# Patient Record
Sex: FEMALE | Race: AMERICAN INDIAN OR ALASKA NATIVE
[De-identification: names, ages, dates, MRNs, and addresses within clinical notes are randomized per-mention and may not be internally consistent; named-entity substitution may affect disease eponyms.]

---

## 2019-01-09 ENCOUNTER — HOSPITAL ENCOUNTER (EMERGENCY)
Dept: HOSPITAL 5 - ED | Age: 29
End: 2019-01-09
Payer: MEDICAID

## 2019-01-09 VITALS — DIASTOLIC BLOOD PRESSURE: 79 MMHG | SYSTOLIC BLOOD PRESSURE: 120 MMHG

## 2019-01-09 DIAGNOSIS — R07.89: Primary | ICD-10-CM

## 2019-01-09 DIAGNOSIS — Z53.21: ICD-10-CM

## 2019-01-09 LAB
BASOPHILS # (AUTO): 0 K/MM3 (ref 0–0.1)
BASOPHILS NFR BLD AUTO: 0.4 % (ref 0–1.8)
BUN SERPL-MCNC: 6 MG/DL (ref 7–17)
BUN/CREAT SERPL: 10 %
CALCIUM SERPL-MCNC: 9.4 MG/DL (ref 8.4–10.2)
EOSINOPHIL # BLD AUTO: 0 K/MM3 (ref 0–0.4)
EOSINOPHIL NFR BLD AUTO: 0.3 % (ref 0–4.3)
HCT VFR BLD CALC: 43.9 % (ref 30.3–42.9)
HEMOLYSIS INDEX: 9
HGB BLD-MCNC: 14.5 GM/DL (ref 10.1–14.3)
LYMPHOCYTES # BLD AUTO: 2.1 K/MM3 (ref 1.2–5.4)
LYMPHOCYTES NFR BLD AUTO: 24.1 % (ref 13.4–35)
MCHC RBC AUTO-ENTMCNC: 33 % (ref 30–34)
MCV RBC AUTO: 91 FL (ref 79–97)
MONOCYTES # (AUTO): 0.6 K/MM3 (ref 0–0.8)
MONOCYTES % (AUTO): 6.4 % (ref 0–7.3)
PLATELET # BLD: 360 K/MM3 (ref 140–440)
RBC # BLD AUTO: 4.82 M/MM3 (ref 3.65–5.03)

## 2019-01-09 PROCEDURE — 36415 COLL VENOUS BLD VENIPUNCTURE: CPT

## 2019-01-09 PROCEDURE — 93005 ELECTROCARDIOGRAM TRACING: CPT

## 2019-01-09 PROCEDURE — 93010 ELECTROCARDIOGRAM REPORT: CPT

## 2019-01-09 PROCEDURE — 84484 ASSAY OF TROPONIN QUANT: CPT

## 2019-01-09 PROCEDURE — 80048 BASIC METABOLIC PNL TOTAL CA: CPT

## 2019-01-09 PROCEDURE — 85025 COMPLETE CBC W/AUTO DIFF WBC: CPT

## 2020-04-23 NOTE — HISTORY AND PHYSICAL REPORT
History of Present Illness


Date of examination: 20


Chief complaint: 





Desires repeat c/s with sterilization


History of present illness: 


Past Pregnancy History 


   :      2


   Term Births:   1


   Premature Births:   0


   Living Children:   1


   Para:      1


   Mult. Births:   0


   Prev :   1


   Aborta:      0


   Elect. Ab:      0


   Spont. Ab:      0


   Ectopics:      0





Pregnancy # 1


   Delivery date:     2014


   Weeks Gestation:   40 4/7


   Delivery type:     


   Anesthesia type:     epidural


   Delivery location:     [facility]


   Infant Sex:      female


   Birth weight:   7.81


   Comments:      uterine atony








Past Medical History:


   Reviewed history from 2011 and no changes required:


      Anemia


GDM diet controlled





Past Surgical History:


   Reviewed history from 2016 and no changes required:


      





Past Medical History 


Abnormal PAP: negative





Social Hx: Patient is single








Infection History 


Hx of STD: trich, CT


Hepatitis B Risk Eval: low risk


Personal hx. of genital herpes: no


Partner hx. of genital herpes: no


Rash, Viral, or Febrile illness since last LMP? no





Genetic History 


 Congenital Heart Defect:


    Mom: no  Dad: no


Canavan Disease:


    Mom: no  Dad: no


Thalassemia


    Mom: no  Dad: no


Neural Tube Defect


    Mom: no  Dad: no


Down's Syndrome


    Mom: no  Dad: no


Jaziel-Sachs


    Mom: no  Dad: no


Sickle Cell Disease/Trait


    Mom: no  Dad: no


Hemophilia


    Mom: no  Dad: no


Muscular Dystrophy


    Mom: no  Dad: no


Cystic Fibrosis


    Mom: no  Dad: no


Gabbs Chorea


    Mom: no  Dad: no


Mental Retardation


    Mom: no  Dad: no


Fragile X


    Mom: no  Dad: no


Other Genetic/Chromosomal Disorder


    Mom: no  Dad: no


Child w/other birth defect


    Mom: no  Dad: no





Enviromental Exposures 


Xray Exposure: no


Medication, drug, or alcohol use since LMP: no


Chemical/Other Exposure: no


Exposure to Cat Liter: no


Hx of Parvovirus (Fifth Disease): no


Occupational Exposure to Children: none


Active Medications (reviewed today):


VITAMINC C TABS () 


DAILY COMBO MULTI VITAMINS ORAL TABLET (MULTIPLE VITAMINS-MINERALS) 





Current Allergies (reviewed today):


No known allergies








Problem # 1:  Maternal care for low transverse scar from previous  

delivery (ICD-654.23) (XOY89-O22.211)





Her updated medication list for this problem includes:


   Prenatal Plus 27-1 Mg Oral Tablet (Prenatal vit-fe fumarate-fa) ..... 1 po 

daily


   Daily Combo Multi Vitamins Oral Tablet (Multiple vitamins-minerals)





Consent reviewed and signed .  The risks and alternatives for this surgery were 

reviewed with the patient. She was informed of possible bleeding, infection, 

injury to bowel, bladder, ureters or other adjacent organs. The patient was 

instructed/informed the following:


   The normal length of hospital stay for this procedure.


   Nothing to eat or drink after midnight the evening prior to surgery. 


 The usual discomforts associated with this procedure were detailed.  Patient 

was given ample opportunity to have all her questions answered before signing 

informed consent. 





Problem # 2:  Sterilization (ICD-V25.2) (XRO33-U65.2) Risks of regret 

emphasized. Permanent and irreversible condition explained to patient. Pt 

verbalized understanding.  Consent reviewed and signed.   The risks and 

alternatives to this surgery were reviewed with the patient. Patient given ample

opportunity to have all her questions answered before signing informed consent. 

Patient informed of possible bleeding. 1%failure rate emphasized 











Problem # 3:  Gestational Diabetes (ICD-648.83) (RLG81-M44.419)











Past History





- Obstetrical History


Expected Date of Delivery: 20


Actual Gestation: 38 Week(s) 6 Day(s) 


: 1





Medications and Allergies


                                    Allergies











Allergy/AdvReac Type Severity Reaction Status Date / Time


 


No Known Allergies Allergy   Verified 14 16:58











                                Home Medications











 Medication  Instructions  Recorded  Confirmed  Last Taken  Type


 


Ciprofloxacin HCl [Cipro] 500 mg PO Q12H #14 tab 10/03/13 11/22/14 Unknown Rx


 


metroNIDAZOLE [Flagyl] 500 mg PO BID #20 tablet 10/03/13 11/22/14 05/27/14 10:00

 Rx





    500 mg 


 


Docusate Sodium [Colace] 100 mg PO BID PRN #60 capsule 14  Unknown Rx


 


Ibuprofen [Motrin] 800 mg PO Q8H PRN #30 tablet 14  Unknown Rx


 


oxyCODONE /ACETAMINOPHEN [Percocet 1 - 2 tab PO Q6HR PRN #30 tablet 14  

Unknown Rx





5/325]     











Active Meds: 


Active Medications





Carboprost Tromethamine (Hemabate)  250 mcg IM ONCE PRN


   PRN Reason: bleeding


Citric Acid/Sodium Citrate (Bicitra)  30 ml PO ONCE ONE


   Stop: 20 07:01


Famotidine (Pepcid)  20 mg IV ONCE ONE


   Stop: 20 07:01


Oxytocin/Sodium Chloride (Pitocin/Ns 20 Unit/1000ml Drip)  20 units in 1,000 mls

@ 0 mls/hr IV TITR ROBY


Lactated Ringer's (Lactated Ringers)  1,000 mls @ 2,250 mls/hr IV PREOP ROBY


   Stop: 20 05:57


Cefazolin Sodium (Ancef/Sterile Water 2 Gm/20 Ml)  2 gm in 20 mls @ 80 mls/hr IV

PREOP NR; Protocol


Lidocaine/Prilocaine (Emla)  1 applic TP ONCE PRN


   PRN Reason: for gonzáles catheter insertion


Methylergonovine Maleate (Methergine)  0.2 mg IM ONCE PRN


   PRN Reason: bleeding


Metoclopramide HCl (Reglan)  10 mg IV ONCE ONE


   Stop: 20 07:01


Misoprostol (Cytotec)  1,000 mcg TX ONCE PRN


   PRN Reason: Bleeding











Results


All other labs normal.








Assessment and Plan





- Patient Problems


(1) 39 weeks gestation of pregnancy


Status: Acute   





(2) Maternal care due to low transverse uterine scar from previous  

delivery


Status: Acute   





(3) Sterilization


Status: Acute   





(4) Gestational diabetes


Status: Acute

## 2020-04-24 ENCOUNTER — HOSPITAL ENCOUNTER (INPATIENT)
Dept: HOSPITAL 5 - APU | Age: 30
LOS: 2 days | Discharge: HOME | End: 2020-04-26
Attending: OBSTETRICS & GYNECOLOGY | Admitting: OBSTETRICS & GYNECOLOGY
Payer: MEDICAID

## 2020-04-24 DIAGNOSIS — Z3A.39: ICD-10-CM

## 2020-04-24 DIAGNOSIS — Z79.899: ICD-10-CM

## 2020-04-24 DIAGNOSIS — O34.211: Primary | ICD-10-CM

## 2020-04-24 DIAGNOSIS — Z30.2: ICD-10-CM

## 2020-04-24 DIAGNOSIS — Z23: ICD-10-CM

## 2020-04-24 LAB
HCT VFR BLD CALC: 32.7 % (ref 30.3–42.9)
HCT VFR BLD CALC: 37.3 % (ref 30.3–42.9)
HGB BLD-MCNC: 11.1 GM/DL (ref 10.1–14.3)
HGB BLD-MCNC: 12.1 GM/DL (ref 10.1–14.3)
MCHC RBC AUTO-ENTMCNC: 32 % (ref 30–34)
MCV RBC AUTO: 79 FL (ref 79–97)
PLATELET # BLD: 267 K/MM3 (ref 140–440)
RBC # BLD AUTO: 4.74 M/MM3 (ref 3.65–5.03)

## 2020-04-24 PROCEDURE — 86900 BLOOD TYPING SEROLOGIC ABO: CPT

## 2020-04-24 PROCEDURE — 36415 COLL VENOUS BLD VENIPUNCTURE: CPT

## 2020-04-24 PROCEDURE — 86901 BLOOD TYPING SEROLOGIC RH(D): CPT

## 2020-04-24 PROCEDURE — 86850 RBC ANTIBODY SCREEN: CPT

## 2020-04-24 PROCEDURE — 85018 HEMOGLOBIN: CPT

## 2020-04-24 PROCEDURE — 0UB70ZZ EXCISION OF BILATERAL FALLOPIAN TUBES, OPEN APPROACH: ICD-10-PCS | Performed by: OBSTETRICS & GYNECOLOGY

## 2020-04-24 PROCEDURE — 85027 COMPLETE CBC AUTOMATED: CPT

## 2020-04-24 PROCEDURE — 82962 GLUCOSE BLOOD TEST: CPT

## 2020-04-24 PROCEDURE — 85014 HEMATOCRIT: CPT

## 2020-04-24 PROCEDURE — 88302 TISSUE EXAM BY PATHOLOGIST: CPT

## 2020-04-24 RX ADMIN — ACETAMINOPHEN SCH MG: 325 TABLET ORAL at 12:00

## 2020-04-24 RX ADMIN — ACETAMINOPHEN SCH MG: 325 TABLET ORAL at 17:34

## 2020-04-24 RX ADMIN — KETOROLAC TROMETHAMINE SCH MG: 30 INJECTION, SOLUTION INTRAMUSCULAR at 14:30

## 2020-04-24 RX ADMIN — KETOROLAC TROMETHAMINE SCH MG: 30 INJECTION, SOLUTION INTRAMUSCULAR at 20:15

## 2020-04-24 NOTE — ANESTHESIA CONSULTATION
Anesthesia Consult and Med Hx


Date of service: 04/24/20





- Airway


Anesthetic Teeth Evaluation: Good


ROM Head & Neck: Adequate


Mental/Hyoid Distance: Adequate





- Pulmonary Exam


CTA: Yes





- Cardiac Exam


Cardiac Exam: RRR





- Pre-Operative Health Status


ASA Pre-Surgery Classification: ASA2, Emergency


Proposed Anesthetic Plan: Spinal





- Pulmonary


Hx Asthma: No


COPD: No


Hx Pneumonia: No





- Cardiovascular System


Hx Hypertension: No





- Central Nervous System


Hx Seizures: No


Hx Psychiatric Problems: No





- Endocrine


Hx Renal Disease: No


Hx End Stage Renal Disease: No


Hx Non-Insulin Dependent Diabetes: Yes (Gestational Diabetic)


Hx Hypothyroidism: No


Hx Hyperthyroidism: No





- Hematic


Hx Anemia: No


Hx Sickle Cell Disease: No





- Other Systems


Hx Alcohol Use: No

## 2020-04-24 NOTE — OPERATIVE REPORT
Operative Report


Operative Report: 





Date of operation:            2020





Pre-operative diagnosis:      1.  39 weeks gestational age


         2.  Previous  delivery desires repeat 


                                 3.  Desires bilateral modified Banning tubal 

ligation for sterilization


         








Post-operative diagnosis:    1.  39 weeks gestational age


         2.  Previous  delivery desires repeat 


         3.  Desires bilateral modified Banning tubal ligation for sterilization


                                      


         





Procedure name(s):             Low transverse  delivery with bilateral 

tubal ligation modified Banning technique for sterilization


 


Surgeon:       Doreen Raphael MD





Assistant:       Shira Bender CST





Anesthesia:       Spinal epidural





EBL:       500 mL





Urine output:       50 mL of clear urine out at the end of the procedure





Fluids:       [] mL


 


Findings:        Liveborn male infant weight 7 Lbs.  7 oz. Apgars of 8 and 9 at 

one and 5 minutes.  Grossly normal uterus tubes and ovaries


         





Indications:       Patient presents for elective repeat  delivery with 

bilateral tubal ligation for sterilization





Procedure:       Patient was taking to the operating room.  Spinal epidural 

anesthesia was placed.  Patient was then prepped and draped in the usual sterile

 fashion   Timeout was performed.  Once an appropriate level of anesthesia was 

noted, a Pfannenstiel incision was made and extended the fascia which was 

incised and extended lateral direction.  The overlying fascia was sharply 

dissected away from the underlying rectus muscles in the superior inferior 

direction.  The midline was entered bluntly.    Bladder blade was placed. The 

large Josue retractor was placed. Vesicouterine fold was incised with blunt d

issection bladder flap was created.  A transverse incision was made in the lower

 uterine segment and extended superolateral direction with finger fractionation.

  Clear fluid was noted.  Infant was delivered from the cephalic position.  

Mouth and nose bulb suctioned.  Cord was doubly clamped and cut infant was given

 to the  resuscitation team present.  The membranes of twin A ruptured. 

 Fluid was clear.  Infant was delivered from the cephalic position.  Infant has 

maintained his cry and excellent tone.  Cord was doubly clamped and cut infant 

was given to the  resuscitation team present.  Placenta was delivered 

and sent to pathology.  The uterus was exteriorized and cleaned of any further 

placental tissue and products of conception.  Uterine incision was approximated 

using 0 Vicryl in a running interlocking stitch followed by further suture of 0 

Vicryl in imbricating fashion.  Once hemostasis was noted, confirmation was 

obtained from patient to proceed with sterilization.  The left tube was grasped 

with a Auburn clamp as ampullary region and elevated into a knuckle where it 

was ligated x2 and excised.  The same procedure was performed on the right 

fallopian tube.  Both segments of the tubes were sent to pathology in separate 

specimen containers.  When hemostasis was noted the uterus was allowed back in 

the pelvic cavity.  Pelvis was irrigated with warm normal saline.    Once  

hemostasis was noted the rectus muscles were approximated using 0 Vicryl 

interrupted simple stitches 3.  Once hemostasis was noted the fascia was 

approximated using 0 Vicryl simple running stitch.  The incision was irrigated 

with warm saline, once hemostasis as noted, the subcuticular adipose tissue was 

reapproximated using 3-0 Vicryl in a simple running fashion.  Once hemostasis wa

s noted, skin was approximated using 4-0 Vicryl on a Jonathan needle in a 

subcuticular manner.





Counts were correct x3.





Patient tolerated the procedure well, she was taken to recovery room in stable 

condition.

## 2020-04-25 PROCEDURE — 3E0234Z INTRODUCTION OF SERUM, TOXOID AND VACCINE INTO MUSCLE, PERCUTANEOUS APPROACH: ICD-10-PCS | Performed by: OBSTETRICS & GYNECOLOGY

## 2020-04-25 RX ADMIN — OXYCODONE AND ACETAMINOPHEN PRN TAB: 5; 325 TABLET ORAL at 17:32

## 2020-04-25 RX ADMIN — KETOROLAC TROMETHAMINE SCH MG: 30 INJECTION, SOLUTION INTRAMUSCULAR at 09:30

## 2020-04-25 RX ADMIN — KETOROLAC TROMETHAMINE SCH MG: 30 INJECTION, SOLUTION INTRAMUSCULAR at 02:22

## 2020-04-25 NOTE — PROGRESS NOTE
Assessment and Plan





- Patient Problems


(1)  delivery delivered


Onset Date: ~20   Current Visit: No   Status: Acute   


Plan to address problem: 


Pt resting No c/o voiced. VSS FF below umb Lochia small Incision D&I H&H  

Asymptomatic. Doing well s/p repeat c/s P: continue pathway Advance as 

tolerated. Plan d/c tomorrow.








Subjective





- Subjective


Date of service: 20 (no c/o voiced)


Principal diagnosis: Day#1 s/p repeat  section


Patient reports: appetite normal, voiding normally, pain well controlled, 

ambulating normally


[place]: doing well





Objective





- Vital Signs


Latest vital signs: 


                                   Vital Signs











  Temp Pulse Resp BP BP Pulse Ox


 


 20 09:30    20   


 


 20 08:10  97.2 F L  88  20   117/77 


 


 20 04:45  98.3 F  99 H  20  119/73   96


 


 20 00:25  98.5 F  102 H  20  115/75   93


 


 20 20:50  98.8 F  74  18  129/77   95


 


 20 16:41  98.1 F  89  18   118/70  98


 


 20 10:45  98.9 F  86  18   106/64  97


 


 20 10:00  98.7 F  93 H  21  86/48   97


 


 20 09:45   84  22  101/52   98








                                Intake and Output











 20





 22:59 06:59 14:59


 


Intake Total 360 360 320


 


Output Total 2550 400 600


 


Balance -2190 -40 -280


 


Intake:   


 


  Oral 360  320


 


  Intake, Free Water  360 


 


Output:   


 


  Urine 2550 400 600


 


    Indwelling Catheter   


 


    Void 500 400 600


 


Other:   


 


  Total, Intake Amount 240  320


 


  Total, Output Amount 500 400 600














- Exam


Breasts: Present: normal


Cardiovascular: Present: Regular rate


Lungs: Present: Clear to auscultation


Abdomen: Present: normal appearance, soft


Uterus: Present: normal


Extremities: Present: normal


Incision: Present: normal, dry, intact

## 2020-04-26 VITALS — SYSTOLIC BLOOD PRESSURE: 113 MMHG | DIASTOLIC BLOOD PRESSURE: 78 MMHG

## 2020-04-26 RX ADMIN — OXYCODONE AND ACETAMINOPHEN PRN TAB: 5; 325 TABLET ORAL at 01:49

## 2020-04-26 RX ADMIN — OXYCODONE AND ACETAMINOPHEN PRN TAB: 5; 325 TABLET ORAL at 08:42

## 2020-04-26 NOTE — DISCHARGE SUMMARY
Providers





- Providers


Date of Admission: 


20 05:21





Date of discharge: 20 (pt agrees with d/c)


Attending physician: 


BRENT POSADAS





                                        





20 11:29


Consult to Lactation Consultant [CONS] Routine 


   Reason For Exam: 











Primary care physician: 


BRENT POSADAS








Hospitalization


Reason for admission: IUP at term


Delivery: 


Procedure: repeat low transverse


Episiotomy: none


Laceration: none


Incision: normal, dry, intact


Other postpartum procedures: none


Postpartum complications: none


Discharge diagnosis: IUP at term delivered


[place] baby: male


Hospital course: 


uncomplicated repeat  section





Pt resting caring for NB. VSS ff below umb Lochia scant Incision D&I H&H stable 

No s/sx of anemia. Doing well s/p repeat c/s P: d/c today with instructions RTO 

1 week postop and circ. RX provided 





Condition at discharge: Good


Disposition: DC-01 TO HOME OR SELFCARE





- Discharge Diagnoses


(1)  delivery delivered


Status: Acute   Comment: RTO 1 week postop visit   





Plan





- Discharge Medications


Prescriptions: 


Lidocain2.5%/Prilocai2.5% [Emla] 5 gm TP ONCE #1 tube


Ibuprofen [Motrin 800 MG tab] 800 mg PO TID PRN #30 tablet


 PRN Reason: Pain


oxyCODONE /ACETAMINOPHEN [Percocet 5/325 mg] 1 - 2 tab PO Q4HR PRN #14 tablet


 PRN Reason: Pain





- Provider Discharge Summary


Activity: routine, no sex for 6 weeks, no heavy lifting 4 weeks, no strenuous 

exercise


Diet: routine


Instructions: routine


Additional instructions: 


[]  Smoking cessation referral if applicable(refer to patient education folder 

for contact #)


[]  Refer to [facility] Booklet








Call your doctor immediately for:


* Fever > 100.5


* Heavy vaginal bleeding ( >1 pad per hour)


* Severe persistent headache


* Shortness of breath


* Reddened, hot, painful area to leg or breast


* Drainage or odor from incision.





* Keep incision clean and dry at all times and follow doctor's instructions 

regarding bathing/showering











- Follow up plan


Follow up: 


BRENT POSADAS MD [Primary Care Provider] - 7 Days


(Congratulations!


Please call and schedule your postpartum visit and son's circumcision in one 

week. [phone].


Take medications as prescribed. Bring EMLA cream with you to his visit. Do NOT 

use at home.


 Call with any concerns.)